# Patient Record
Sex: FEMALE | Race: BLACK OR AFRICAN AMERICAN | Employment: UNEMPLOYED | URBAN - METROPOLITAN AREA
[De-identification: names, ages, dates, MRNs, and addresses within clinical notes are randomized per-mention and may not be internally consistent; named-entity substitution may affect disease eponyms.]

---

## 2017-08-23 ENCOUNTER — OFFICE VISIT (OUTPATIENT)
Dept: FAMILY MEDICINE CLINIC | Age: 27
End: 2017-08-23

## 2017-08-23 VITALS
HEART RATE: 96 BPM | DIASTOLIC BLOOD PRESSURE: 63 MMHG | SYSTOLIC BLOOD PRESSURE: 101 MMHG | WEIGHT: 169.6 LBS | HEIGHT: 62 IN | BODY MASS INDEX: 31.21 KG/M2 | TEMPERATURE: 97.7 F | OXYGEN SATURATION: 98 % | RESPIRATION RATE: 16 BRPM

## 2017-08-23 DIAGNOSIS — Z83.3 FAMILY HISTORY OF DIABETES MELLITUS: ICD-10-CM

## 2017-08-23 DIAGNOSIS — G43.C0 PERIODIC HEADACHE SYNDROME, NOT INTRACTABLE: Primary | ICD-10-CM

## 2017-08-23 DIAGNOSIS — Z76.89 ESTABLISHING CARE WITH NEW DOCTOR, ENCOUNTER FOR: ICD-10-CM

## 2017-08-23 LAB
HCG URINE, QL. (POC): NEGATIVE
VALID INTERNAL CONTROL?: NO

## 2017-08-23 RX ORDER — BUTALBITAL, ACETAMINOPHEN AND CAFFEINE 50; 325; 40 MG/1; MG/1; MG/1
1 TABLET ORAL
Qty: 30 TAB | Refills: 0 | Status: SHIPPED | OUTPATIENT
Start: 2017-08-23

## 2017-08-23 RX ORDER — TOPIRAMATE 25 MG/1
25 TABLET ORAL 2 TIMES DAILY
Qty: 60 TAB | Refills: 2 | Status: SHIPPED | OUTPATIENT
Start: 2017-08-23 | End: 2017-12-11 | Stop reason: SDUPTHER

## 2017-08-23 NOTE — PROGRESS NOTES
Subjective:      Shane Hayes is a 24 y.o. female who comes in for evaluation of headache. Today it's not that bad. Description of Headaches:  Location of pain: varies throughout head  Radiation of pain?:bilateral  Character of pain:aching, throbbing  Severity of pain: 10  Accompanying symptoms: nausea  Prodromal sx?: none  Rapidity of onset: gradual  Typical duration of individual headache: all day sometimes. Are most headaches similar in presentation? yes  Typical precipitants: stress    Temporal Pattern of Headaches:  Started having HAs since 17yoa. Went to Neurologist and had \"head scan\" ,\"but didn't see anything\". Current Use of Meds to Treat HA:  Abortive meds? BC's  Daily use? yes   Prophylactic meds? none    Additional Relevant History:  History of head/neck trauma? no  History of head/neck surgery? no  Family h/o headache problems? yes - father has migraine    Hx of meningitis and was admitted to hospital in 2012. History reviewed. No pertinent past medical history. Family History   Problem Relation Age of Onset    Diabetes Mother     Elevated Lipids Mother     Cancer Father     Migraines Father     Stroke Father     Diabetes Maternal Aunt     Asthma Maternal Uncle     Cancer Maternal Uncle     Cancer Paternal Grandmother        No Known Allergies  Social History     Social History    Marital status: SINGLE     Spouse name: N/A    Number of children: N/A    Years of education: N/A     Occupational History    Not on file.      Social History Main Topics    Smoking status: Current Every Day Smoker     Packs/day: 0.50     Years: 7.00    Smokeless tobacco: Never Used    Alcohol use Yes    Drug use: No    Sexual activity: Not Currently     Partners: Male     Birth control/ protection: None     Other Topics Concern    Not on file     Social History Narrative     Review of Systems  A comprehensive review of systems was negative except for that written in the HPI.    Objective:     Visit Vitals    /63 (BP 1 Location: Left arm, BP Patient Position: Sitting)    Pulse 96    Temp 97.7 °F (36.5 °C) (Oral)    Resp 16    Ht 5' 2\" (1.575 m)    Wt 169 lb 9.6 oz (76.9 kg)    LMP 08/08/2017    SpO2 98%    BMI 31.02 kg/m2       General: alert, cooperative, no distress, appears stated age   Affect:   Normal, full range of affect   Cranial masses:  no   Cranial tender:  no   Temporal artery tender:  no   TMJ tender:  no   Sinuses/nose:  negative   Hyperesthesia of face:  no   Signs of cluster:  None   Eyes:  conjunctivae/corneas clear. PERRL, EOM's intact. Head/neck bruits:  None   Neck ROM:  normal   Neck masses:  no   Neck tenderness:  no   Neurologic:  normal without focal findings  mental status, speech normal, alert and oriented x iii   Lungs: CTA bilat  Heart: RRR, no R/M/G, normal S1, S2. Assessment:     Diagnoses and all orders for this visit:    1. Periodic headache syndrome, not intractable  -     METABOLIC PANEL, COMPREHENSIVE  -     TSH RFX ON ABNORMAL TO FREE T4  -     AMB POC URINE PREGNANCY TEST, VISUAL COLOR COMPARISON  -     topiramate (TOPAMAX) 25 mg tablet; Take 1 Tab by mouth two (2) times a day. -     butalbital-acetaminophen-caffeine (FIORICET, ESGIC) -40 mg per tablet; Take 1 Tab by mouth two (2) times daily as needed for Pain. Max Daily Amount: 2 Tabs. 2. Family history of diabetes mellitus    3. Establishing care with new doctor, encounter for      Follow-up Disposition:  Return in about 4 weeks (around 9/20/2017) for annual exam, headaches.       Tess Tate MD  8/23/2017

## 2017-08-23 NOTE — MR AVS SNAPSHOT
Visit Information Date & Time Provider Department Dept. Phone Encounter #  
 8/23/2017  9:30 AM Latesha Fuentes MD [facility] at [address] [id number] Follow-up Instructions Return in about 4 weeks (around 9/20/2017) for annual exam, headaches. Upcoming Health Maintenance Date Due  
 HPV AGE 9Y-34Y (1 of 3 - Female 3 Dose Series) 3/3/2003 DTaP/Tdap/Td series (1 - Tdap) 3/3/2013 PAP AKA CERVICAL CYTOLOGY 3/3/2013 INFLUENZA AGE 9 TO ADULT 8/1/2017 Allergies as of 8/23/2017  Review Complete On: 8/23/2017 By: Latesha Fuentes MD  
 No Known Allergies Current Immunizations  Never Reviewed No immunizations on file. Not reviewed this visit You Were Diagnosed With   
  
 Codes Comments Periodic headache syndrome, not intractable    -  Primary ICD-10-CM: G43. C0 ICD-9-CM: 346.20 Family history of diabetes mellitus     ICD-10-CM: Z83.3 ICD-9-CM: V18.0 Establishing care with new doctor, encounter for     ICD-10-CM: Z71.89 ICD-9-CM: V65.8 Vitals BP Pulse Temp Resp Height(growth percentile) Weight(growth percentile) 101/63 (BP 1 Location: Left arm, BP Patient Position: Sitting) 96 97.7 °F (36.5 °C) (Oral) 16 5' 2\" (1.575 m) 169 lb 9.6 oz (76.9 kg) LMP SpO2 BMI OB Status Smoking Status 08/08/2017 98% 31.02 kg/m2 Having regular periods Current Every Day Smoker Vitals History BMI and BSA Data Body Mass Index Body Surface Area 31.02 kg/m 2 1.83 m 2 Preferred Pharmacy Pharmacy Name Phone [id number] [address] [phone] Your Updated Medication List  
  
   
This list is accurate as of: 8/23/17 10:17 AM.  Always use your most recent med list.  
  
  
  
  
 butalbital-acetaminophen-caffeine -40 mg per tablet Commonly known as:  Kady Enriquez  
 Take 1 Tab by mouth two (2) times daily as needed for Pain. Max Daily Amount: 2 Tabs. topiramate 25 mg tablet Commonly known as:  TOPAMAX Take 1 Tab by mouth two (2) times a day. Prescriptions Printed Refills  
 butalbital-acetaminophen-caffeine (FIORICET, ESGIC) -40 mg per tablet 0 Sig: Take 1 Tab by mouth two (2) times daily as needed for Pain. Max Daily Amount: 2 Tabs. Class: Print Route: Oral  
  
Prescriptions Sent to Pharmacy Refills  
 topiramate (TOPAMAX) 25 mg tablet 2 Sig: Take 1 Tab by mouth two (2) times a day. Class: Normal  
 Pharmacy: [facility] [address] [facility] #: [phone] Route: Oral  
  
We Performed the Following AMB POC URINE PREGNANCY TEST, VISUAL COLOR COMPARISON [81093 CPT(R)] METABOLIC PANEL, COMPREHENSIVE [59138 CPT(R)] TSH RFX ON ABNORMAL TO FREE T4 [[id number] Custom] Follow-up Instructions Return in about 4 weeks (around 9/20/2017) for annual exam, headaches. Introducing [facility]! Blanca Clarke introduces [employer] patient portal. Now you can access parts of your medical record, email your doctor's office, and request medication refills online. 1. In your internet browser, go to [email or web address] 2. Click on the First Time User? Click Here link in the Sign In box. You will see the New Member Sign Up page. 3. Enter your [employer] Access Code exactly as it appears below. You will not need to use this code after youve completed the sign-up process. If you do not sign up before the expiration date, you must request a new code. · [employer] Access Code: [email or web address] Expires: 11/21/2017 10:17 AM 
 
4. Enter the last four digits of your Social Security Number (xxxx) and Date of Birth (mm/dd/yyyy) as indicated and click Submit. You will be taken to the next sign-up page. 5. Create a [employer] ID. This will be your [employer] login ID and cannot be changed, so think of one that is secure and easy to remember. 6. Create a [employer] password. You can change your password at any time. 7. Enter your Password Reset Question and Answer. This can be used at a later time if you forget your password. 8. Enter your e-mail address. You will receive e-mail notification when new information is available in [address] 9. Click Sign Up. You can now view and download portions of your medical record. 10. Click the Download Summary menu link to download a portable copy of your medical information. If you have questions, please visit the Frequently Asked Questions section of the [employer] website. Remember, [employer] is NOT to be used for urgent needs. For medical emergencies, dial 911. Now available from your iPhone and Android! Please provide this summary of care documentation to your next provider. Your primary care clinician is listed as PROVIDER UNKNOWN. If you have any questions after today's visit, please call [phone].

## 2017-08-23 NOTE — PROGRESS NOTES
Pt is a new patient here for headaches.  States she has had them since age 15 but now they are more frequently

## 2017-12-11 DIAGNOSIS — G43.C0 PERIODIC HEADACHE SYNDROME, NOT INTRACTABLE: ICD-10-CM

## 2017-12-11 RX ORDER — TOPIRAMATE 25 MG/1
TABLET ORAL
Qty: 60 TAB | Refills: 2 | Status: SHIPPED | OUTPATIENT
Start: 2017-12-11